# Patient Record
Sex: FEMALE | Race: WHITE | URBAN - METROPOLITAN AREA
[De-identification: names, ages, dates, MRNs, and addresses within clinical notes are randomized per-mention and may not be internally consistent; named-entity substitution may affect disease eponyms.]

---

## 2020-03-18 ENCOUNTER — OFFICE VISIT (OUTPATIENT)
Dept: URBAN - METROPOLITAN AREA CLINIC 22 | Facility: CLINIC | Age: 25
End: 2020-03-18
Payer: COMMERCIAL

## 2020-03-18 DIAGNOSIS — H52.13 MYOPIA, BILATERAL: Primary | ICD-10-CM

## 2020-03-18 PROCEDURE — 92310 CONTACT LENS FITTING OU: CPT | Performed by: OPTOMETRIST

## 2020-03-18 PROCEDURE — 92002 INTRM OPH EXAM NEW PATIENT: CPT | Performed by: OPTOMETRIST

## 2020-03-18 ASSESSMENT — VISUAL ACUITY
OD: 20/20
OS: 20/20

## 2020-03-18 ASSESSMENT — INTRAOCULAR PRESSURE
OS: 18
OD: 21

## 2020-03-18 NOTE — IMPRESSION/PLAN
Impression: Myopia, bilateral: H52.13. OU. Plan: Refractive error accounts for symptoms. Release SRX. CL fit performed today, fist time wearer with new brand and updated power. Cornea in good condition, no concern with current CL parameters, discussed good CL wear habits and hygiene, including no sleeping in lenses. Trial CL's x 1-2 weeks, RTC 1-2 weeks x follow up. I&R training today.

## 2021-04-21 ENCOUNTER — OFFICE VISIT (OUTPATIENT)
Dept: URBAN - METROPOLITAN AREA CLINIC 22 | Facility: CLINIC | Age: 26
End: 2021-04-21
Payer: COMMERCIAL

## 2021-04-21 PROCEDURE — 92014 COMPRE OPH EXAM EST PT 1/>: CPT | Performed by: STUDENT IN AN ORGANIZED HEALTH CARE EDUCATION/TRAINING PROGRAM

## 2021-04-21 PROCEDURE — 92310 CONTACT LENS FITTING OU: CPT | Performed by: STUDENT IN AN ORGANIZED HEALTH CARE EDUCATION/TRAINING PROGRAM

## 2021-04-21 ASSESSMENT — VISUAL ACUITY
OS: 20/25
OD: 20/20

## 2021-04-21 ASSESSMENT — INTRAOCULAR PRESSURE
OS: 18
OD: 18

## 2021-04-21 ASSESSMENT — KERATOMETRY
OS: 43.73
OD: 44.38

## 2023-03-23 ENCOUNTER — OFFICE VISIT (OUTPATIENT)
Dept: URBAN - METROPOLITAN AREA CLINIC 22 | Facility: CLINIC | Age: 28
End: 2023-03-23
Payer: COMMERCIAL

## 2023-03-23 DIAGNOSIS — H52.13 MYOPIA, BILATERAL: Primary | ICD-10-CM

## 2023-03-23 PROCEDURE — 92310 CONTACT LENS FITTING OU: CPT | Performed by: STUDENT IN AN ORGANIZED HEALTH CARE EDUCATION/TRAINING PROGRAM

## 2023-03-23 PROCEDURE — 92014 COMPRE OPH EXAM EST PT 1/>: CPT | Performed by: STUDENT IN AN ORGANIZED HEALTH CARE EDUCATION/TRAINING PROGRAM

## 2023-03-23 ASSESSMENT — INTRAOCULAR PRESSURE
OS: 14
OD: 15

## 2023-03-23 ASSESSMENT — KERATOMETRY
OD: 43.50
OS: 43.63

## 2023-03-23 ASSESSMENT — VISUAL ACUITY
OD: 20/20
OS: 20/20

## 2023-03-23 NOTE — IMPRESSION/PLAN
Impression: Myopia, bilateral: H52.13. Plan: Discussed findings. New glasses and contact lens Rx finalized and given to patient. Patient wishes to cont w/ [employer], will try AT.